# Patient Record
Sex: MALE | Race: WHITE
[De-identification: names, ages, dates, MRNs, and addresses within clinical notes are randomized per-mention and may not be internally consistent; named-entity substitution may affect disease eponyms.]

---

## 2018-08-29 ENCOUNTER — HOSPITAL ENCOUNTER (OUTPATIENT)
Dept: HOSPITAL 53 - M OPP | Age: 63
Discharge: HOME | End: 2018-08-29
Attending: SURGERY
Payer: MEDICARE

## 2018-08-29 DIAGNOSIS — Z80.3: ICD-10-CM

## 2018-08-29 DIAGNOSIS — D12.2: ICD-10-CM

## 2018-08-29 DIAGNOSIS — F63.9: ICD-10-CM

## 2018-08-29 DIAGNOSIS — F32.9: ICD-10-CM

## 2018-08-29 DIAGNOSIS — R56.9: ICD-10-CM

## 2018-08-29 DIAGNOSIS — Z79.899: ICD-10-CM

## 2018-08-29 DIAGNOSIS — K21.9: ICD-10-CM

## 2018-08-29 DIAGNOSIS — K63.89: ICD-10-CM

## 2018-08-29 DIAGNOSIS — F41.9: ICD-10-CM

## 2018-08-29 DIAGNOSIS — R39.89: ICD-10-CM

## 2018-08-29 DIAGNOSIS — D12.3: ICD-10-CM

## 2018-08-29 DIAGNOSIS — Z86.010: ICD-10-CM

## 2018-08-29 DIAGNOSIS — R12: ICD-10-CM

## 2018-08-29 DIAGNOSIS — D49.0: ICD-10-CM

## 2018-08-29 DIAGNOSIS — R26.89: ICD-10-CM

## 2018-08-29 DIAGNOSIS — G82.50: ICD-10-CM

## 2018-08-29 DIAGNOSIS — F70: ICD-10-CM

## 2018-08-29 DIAGNOSIS — M81.0: ICD-10-CM

## 2018-08-29 DIAGNOSIS — Z09: Primary | ICD-10-CM

## 2018-08-29 DIAGNOSIS — K64.8: ICD-10-CM

## 2018-08-29 DIAGNOSIS — K59.00: ICD-10-CM

## 2018-08-29 DIAGNOSIS — Z88.8: ICD-10-CM

## 2018-08-29 DIAGNOSIS — M19.90: ICD-10-CM

## 2018-08-29 DIAGNOSIS — Z80.6: ICD-10-CM

## 2018-08-29 PROCEDURE — 45385 COLONOSCOPY W/LESION REMOVAL: CPT

## 2018-10-29 ENCOUNTER — HOSPITAL ENCOUNTER (EMERGENCY)
Dept: HOSPITAL 53 - M ED | Age: 63
Discharge: HOME | End: 2018-10-29
Payer: MEDICARE
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
## 2018-10-29 DIAGNOSIS — T14.8XXA: ICD-10-CM

## 2018-10-29 DIAGNOSIS — Z88.8: ICD-10-CM
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
## 2018-10-29 DIAGNOSIS — M47.812: ICD-10-CM
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
## 2018-10-29 DIAGNOSIS — S00.03XA: Primary | ICD-10-CM

## 2018-10-29 DIAGNOSIS — M25.78: ICD-10-CM
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
## 2018-10-29 DIAGNOSIS — Z79.899: ICD-10-CM

## 2018-10-29 PROCEDURE — 72110 X-RAY EXAM L-2 SPINE 4/>VWS: CPT

## 2018-10-29 RX ADMIN — HYDROCODONE BITARTRATE AND ACETAMINOPHEN 1 TAB: 5; 325 TABLET ORAL at 13:43

## 2018-11-14 ENCOUNTER — HOSPITAL ENCOUNTER (OUTPATIENT)
Dept: HOSPITAL 53 - M LAB | Age: 63
End: 2018-11-14
Attending: PHYSICIAN ASSISTANT
Payer: MEDICARE

## 2018-11-14 DIAGNOSIS — F63.81: Primary | ICD-10-CM

## 2018-11-14 DIAGNOSIS — Z79.899: ICD-10-CM

## 2018-11-14 LAB
BASO #: 0 10^3/UL (ref 0–0.2)
BASO %: 0 % (ref 0–1)
CHOLEST SERPL-MCNC: 176 MG/DL (ref ?–200)
CHOLESTEROL RISK RATIO: 3.74 (ref ?–5)
EOS #: 0.2 10^3/UL (ref 0–0.5)
EOSINOPHIL NFR BLD AUTO: 1.9 % (ref 0–3)
EST. AVERAGE GLUCOSE BLD GHB EST-MCNC: 111 MG/DL (ref 60–110)
HDLC SERPL-MCNC: 47 MG/DL (ref 40–?)
HEMATOCRIT: 39.5 % (ref 42–52)
HEMOGLOBIN: 12.8 G/DL (ref 13.5–17.5)
IMMATURE GRANULOCYTE #: 0 10^3/UL (ref 0–0)
IMMATURE GRANULOCYTE %: 0.5 % (ref 0–3)
LDL CHOLESTEROL: 81 MG/DL (ref ?–100)
LYMPH #: 1.6 10^3/UL (ref 1.5–4.5)
LYMPH %: 20.3 % (ref 24–44)
MEAN CORPUSCULAR HEMOGLOBIN: 31.6 PG (ref 27–33)
MEAN CORPUSCULAR HGB CONC: 32.4 G/DL (ref 32–36.5)
MEAN CORPUSCULAR VOLUME: 97.5 FL (ref 80–96)
MONO #: 0.6 10^3/UL (ref 0–0.8)
MONO %: 7 % (ref 0–5)
NEUTROPHILS #: 5.7 10^3/UL (ref 1.8–7.7)
NEUTROPHILS %: 70.3 % (ref 36–66)
NONHDLC SERPL-MCNC: 129 MG/DL
NRBC BLD AUTO-RTO: 0 % (ref 0–0)
PLATELET COUNT, AUTOMATED: 296 10^3/UL (ref 150–450)
PROLACTIN: 8.6 NG/ML (ref 2.1–17.7)
RED BLOOD COUNT: 4.05 10^6/UL (ref 4.3–6.1)
RED CELL DISTRIBUTION WIDTH: 12.9 % (ref 11.5–14.5)
TRIGLYCERIDES LEVEL: 240 MG/DL (ref ?–150)
WHITE BLOOD COUNT: 8 10^3/UL (ref 4–10)

## 2018-11-14 PROCEDURE — 84146 ASSAY OF PROLACTIN: CPT

## 2019-03-01 ENCOUNTER — HOSPITAL ENCOUNTER (OUTPATIENT)
Dept: HOSPITAL 53 - M LAB REF | Age: 64
End: 2019-03-01
Attending: FAMILY MEDICINE
Payer: MEDICARE

## 2019-03-01 DIAGNOSIS — D64.9: Primary | ICD-10-CM

## 2019-03-01 LAB
FERRITIN SERPL-MCNC: 89 NG/ML (ref 26–388)
FOLATE SERPL-MCNC: 20.8 NG/ML
VIT B12 SERPL-MCNC: 858 PG/ML

## 2019-07-15 ENCOUNTER — HOSPITAL ENCOUNTER (OUTPATIENT)
Dept: HOSPITAL 53 - M LABNEURO | Age: 64
End: 2019-07-15
Attending: PSYCHIATRY & NEUROLOGY
Payer: MEDICARE

## 2019-07-15 DIAGNOSIS — R56.9: Primary | ICD-10-CM

## 2019-07-15 LAB
ALBUMIN SERPL BCG-MCNC: 3.8 GM/DL (ref 3.2–5.2)
ALT SERPL W P-5'-P-CCNC: 24 U/L (ref 12–78)
BASOPHILS # BLD AUTO: 0 10^3/UL (ref 0–0.2)
BASOPHILS NFR BLD AUTO: 0.4 % (ref 0–1)
BILIRUB SERPL-MCNC: 0.4 MG/DL (ref 0.2–1)
BUN SERPL-MCNC: 14 MG/DL (ref 7–18)
CALCIUM SERPL-MCNC: 9.7 MG/DL (ref 8.8–10.2)
CHLORIDE SERPL-SCNC: 104 MEQ/L (ref 98–107)
CO2 SERPL-SCNC: 29 MEQ/L (ref 21–32)
CREAT SERPL-MCNC: 0.79 MG/DL (ref 0.7–1.3)
EOSINOPHIL # BLD AUTO: 0.1 10^3/UL (ref 0–0.5)
EOSINOPHIL NFR BLD AUTO: 0.6 % (ref 0–3)
GFR SERPL CREATININE-BSD FRML MDRD: > 60 ML/MIN/{1.73_M2} (ref 49–?)
GLUCOSE SERPL-MCNC: 86 MG/DL (ref 70–100)
HCT VFR BLD AUTO: 38.9 % (ref 42–52)
HGB BLD-MCNC: 12.6 G/DL (ref 13.5–17.5)
LYMPHOCYTES # BLD AUTO: 1.8 10^3/UL (ref 1.5–4.5)
LYMPHOCYTES NFR BLD AUTO: 19.7 % (ref 24–44)
MCH RBC QN AUTO: 30.8 PG (ref 27–33)
MCHC RBC AUTO-ENTMCNC: 32.4 G/DL (ref 32–36.5)
MCV RBC AUTO: 95.1 FL (ref 80–96)
MONOCYTES # BLD AUTO: 0.8 10^3/UL (ref 0–0.8)
MONOCYTES NFR BLD AUTO: 8.7 % (ref 0–5)
NEUTROPHILS # BLD AUTO: 6.5 10^3/UL (ref 1.8–7.7)
NEUTROPHILS NFR BLD AUTO: 70.3 % (ref 36–66)
PLATELET # BLD AUTO: 305 10^3/UL (ref 150–450)
POTASSIUM SERPL-SCNC: 4.3 MEQ/L (ref 3.5–5.1)
PROT SERPL-MCNC: 7.7 GM/DL (ref 6.4–8.2)
RBC # BLD AUTO: 4.09 10^6/UL (ref 4.3–6.1)
SODIUM SERPL-SCNC: 140 MEQ/L (ref 136–145)
WBC # BLD AUTO: 9.3 10^3/UL (ref 4–10)

## 2019-10-15 ENCOUNTER — HOSPITAL ENCOUNTER (EMERGENCY)
Dept: HOSPITAL 53 - M ED | Age: 64
Discharge: HOME | End: 2019-10-15
Payer: MEDICARE

## 2019-10-15 VITALS — SYSTOLIC BLOOD PRESSURE: 167 MMHG | DIASTOLIC BLOOD PRESSURE: 81 MMHG
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
## 2019-10-15 DIAGNOSIS — S40.012A: ICD-10-CM
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
## 2019-10-15 DIAGNOSIS — R56.9: ICD-10-CM

## 2019-10-15 DIAGNOSIS — Z79.899: ICD-10-CM

## 2019-10-15 DIAGNOSIS — Z88.8: ICD-10-CM

## 2019-10-15 DIAGNOSIS — S70.01XA: Primary | ICD-10-CM

## 2019-10-15 DIAGNOSIS — N40.0: ICD-10-CM

## 2019-10-15 NOTE — REP
CT brain:  10/15/2019.

 

Indication:  Head trauma.

 

Comparison:  10/29/2019.

 

Technique:  Unenhanced axial images of the brain were obtained from skull base to

vertex.

 

Findings:

 

There is no acute intracranial hemorrhage, acute cortical infarction, mass

effect, hydrocephalus or acute calvarial fracture.  Mild diffuse volume loss is

present.  There are a few patchy areas of hypoattenuation scattered throughout

the subcortical and periventricular white matter most consistent with early

microangiopathic ischemic disease.

 

Impression:

 

No acute intracranial process.

 

 

Electronically Signed by

Adelfo Sharp DO 10/15/2019 10:55 A

## 2019-10-15 NOTE — REP
CT cervical spine:  10/15/2019.

 

Indication:  Cervical spine trauma.

 

Comparison:  10/29/2019.

 

Findings:

 

There is no acute fracture, subluxation or dislocation.  There is exaggeration of

the cervical lordosis.  Multilevel degenerative changes are present with the

greatest narrowing of the spinal canal at C3/C4 and C4/C5 with flattening of the

ventral cord.  There is a subcentimeter lucency within the superior aspect of C5

along the superior endplate most likely representing a cyst.  Stable.  There is

no hemorrhage within the spinal canal.

 

Impression:

 

No acute post traumatic injuries of the cervical spine.

 

 

Electronically Signed by

Adelfo Sharp DO 10/15/2019 11:02 A

## 2019-10-15 NOTE — REP
Left shoulder:  Three views.

 

History:  Injury in a fall.

 

Comparison left shoulder radiographs are from May 13, 2018.

 

Findings:  The left glenohumeral and acromioclavicular joints are normally

aligned.  No fracture is seen.  There is mild AC joint spurring.  This is

unchanged.  Periarticular soft tissues are unremarkable.  There is no visible

fracture or subluxation.

 

Impression:

 

Mild AC joint spurring.  No fracture or subluxation seen.

 

 

Electronically Signed by

Junaid Maciel MD 10/15/2019 06:44 P

## 2019-10-15 NOTE — REP
Pelvis right hip:  Three views.

 

History:  Injury in a fall.

 

Findings:  AP view of the pelvis shows an intact bony pelvic ring.  The patient

is rotated somewhat to the left.  There is osteoarthritis of the hips bilaterally

but no hip fracture is appreciated.  There is also tendon insertion site spurring

on the greater and lesser trochanters.  Degenerative disc disease is seen at L4-5

in the lumbar spine.  The bowel gas pattern is unremarkable.  AP and frog-leg

views of the right hip show osteoarthritis and trochanteric tendon insertion site

spurring.

 

Impression:

 

No acute bony abnormality.

 

 

Electronically Signed by

Junaid Maciel MD 10/15/2019 06:44 P

## 2020-01-17 ENCOUNTER — HOSPITAL ENCOUNTER (OUTPATIENT)
Dept: HOSPITAL 53 - M ST | Age: 65
End: 2020-01-17
Attending: INTERNAL MEDICINE
Payer: MEDICARE

## 2020-01-17 DIAGNOSIS — R13.10: Primary | ICD-10-CM

## 2020-01-17 NOTE — REP
COOKIE SWALLOW

 

The procedure was performed under the direct supervision of Dr. Maciel.

 

The procedure was performed with Sully Brasher from speech pathology present.

 

5 ml aliquots of thin, pudding, mixed fruit, nectar, honey and puree consistency

barium was administered.  There is laryngeal penetration with all consistency

barium except mixed fruit.

 

The detailed report of this examination will be provided by speech pathology.

 

1.4 minutes of fluoroscopy time was utilized for this procedure.

 

 

Electronically Signed by

CAITLYN Murillo 01/17/2020 03:33 P

Electronically Signed by

Junaid Maciel MD 01/17/2020 08:43 P

## 2020-01-28 ENCOUNTER — HOSPITAL ENCOUNTER (OUTPATIENT)
Dept: HOSPITAL 53 - M ADAMS | Age: 65
End: 2020-01-28
Attending: NURSE PRACTITIONER
Payer: MEDICARE

## 2020-01-28 DIAGNOSIS — M79.641: Primary | ICD-10-CM

## 2020-01-29 NOTE — REP
RIGHT HAND THREE VIEWS:

 

Three views of the right hand performed.

 

The study is extremely limited as the fingers are in a flexed position and the

patient is unable to straightened the 2nd through 5th digits.  No definite

fracture or dislocation is seen.

 

 

Electronically Signed by

Hector Hernandez MD 01/29/2020 10:45 P

## 2021-01-20 ENCOUNTER — HOSPITAL ENCOUNTER (OUTPATIENT)
Dept: HOSPITAL 53 - M LABSMTC | Age: 66
End: 2021-01-20
Attending: ANESTHESIOLOGY
Payer: MEDICARE

## 2021-01-20 DIAGNOSIS — Z20.828: ICD-10-CM

## 2021-01-20 DIAGNOSIS — Z01.812: Primary | ICD-10-CM

## 2021-03-10 ENCOUNTER — HOSPITAL ENCOUNTER (OUTPATIENT)
Dept: HOSPITAL 53 - M LABSMTC | Age: 66
End: 2021-03-10
Attending: ANESTHESIOLOGY
Payer: MEDICARE

## 2021-03-10 DIAGNOSIS — Z01.812: Primary | ICD-10-CM

## 2021-03-10 DIAGNOSIS — Z20.822: ICD-10-CM

## 2021-03-15 ENCOUNTER — HOSPITAL ENCOUNTER (OUTPATIENT)
Dept: HOSPITAL 53 - M OPP | Age: 66
Discharge: HOME | End: 2021-03-15
Attending: INTERNAL MEDICINE
Payer: MEDICARE

## 2021-03-15 VITALS — DIASTOLIC BLOOD PRESSURE: 114 MMHG | SYSTOLIC BLOOD PRESSURE: 220 MMHG

## 2021-03-15 VITALS — BODY MASS INDEX: 25.88 KG/M2 | WEIGHT: 161 LBS | HEIGHT: 66 IN

## 2021-03-15 DIAGNOSIS — Z79.899: ICD-10-CM

## 2021-03-15 DIAGNOSIS — Z88.8: ICD-10-CM

## 2021-03-15 DIAGNOSIS — Z12.11: Primary | ICD-10-CM

## 2021-03-15 DIAGNOSIS — Z86.010: ICD-10-CM

## 2021-03-15 NOTE — ROOR
________________________________________________________________________________

Patient Name: Juanjose Nielsen         Procedure Date: 3/15/2021 8:39 AM

MRN: [id number]                          Account Number: [id number]

YOB: 1955              Age: 65

Room: [facility]                            Gender: Male

Note Status: Finalized                 

________________________________________________________________________________

 

Procedure:            Colonoscopy to 50 cms-POOR PREP

Indications:          High risk colon cancer surveillance: Personal history of 

                      colonic polyps, Last colonoscopy: 2015

Providers:            Moreno Hernández MD

Referring MD:         James Diamond MD

Requesting Provider:  

Medicines:            Monitored Anesthesia Care

Complications:        No immediate complications.

________________________________________________________________________________

Procedure:            Pre-Anesthesia Assessment:

                      - The heart rate, respiratory rate, oxygen saturations, 

                      blood pressure, adequacy of pulmonary ventilation, and 

                      response to care were monitored throughout the procedure.

                      The Colonoscope was introduced through the anus with the 

                      intention of advancing to the cecum. The scope was 

                      advanced to the sigmoid colon before the procedure was 

                      aborted. Medications were given. The colonoscopy was 

                      performed without difficulty. The patient tolerated the 

                      procedure well. The quality of the bowel preparation was 

                      inadequate.

                                                                                

Findings:

     The perianal and digital rectal examinations were normal.

     A large amount of stool was found in the rectum, in the recto-sigmoid 

     colon and in the descending colon, making visualization difficult.

     The entire examined colon appeared normal.

                                                                                

Impression:           - Preparation of the colon was inadequate.

                      - Stool in the rectum, in the recto-sigmoid colon and in 

                      the descending colon.

                      - The entire examined colon is normal.

                      - No specimens collected.

Recommendation:       - Patient has a contact number available for 

                      emergencies. The signs and symptoms of potential delayed 

                      complications were discussed with the patient. Return to 

                      normal activities tomorrow. Written discharge 

                      instructions were provided to the patient.

                      - Discharge patient to home.

                      - Resume previous diet.

                      - Repeat colonoscopy at appointment to be scheduled 

                      because the bowel preparation was poor.

                      - Return to referring physician.

                      - The findings and recommendations were discussed with 

                      the patient.

                                                                                

Procedure Code(s):    --- Professional ---

                      , 53, Colorectal cancer screening; colonoscopy on 

                      individual at high risk

Diagnosis Code(s):    --- Professional ---

                      Z86.010, Personal history of colonic polyps

 

CPT copyright 2019 [employer]. All rights reserved.

 

The codes documented in this report are preliminary and upon  review may 

be revised to meet current compliance requirements.

 

Moreno Hernández MD

____________________

Moreno Hernández MD

3/15/2021 8:59:44 AM

Electronically signed by Moreno Hernández MD

Number of Addenda: 0

 

Note Initiated On: 3/15/2021 8:39 AM

Estimated Blood Loss: Estimated blood loss: none.

## 2021-05-19 ENCOUNTER — HOSPITAL ENCOUNTER (OUTPATIENT)
Dept: HOSPITAL 53 - M LABSMTC | Age: 66
End: 2021-05-19
Attending: ANESTHESIOLOGY
Payer: MEDICARE

## 2021-05-19 DIAGNOSIS — Z01.818: Primary | ICD-10-CM

## 2021-05-19 DIAGNOSIS — Z11.59: ICD-10-CM

## 2021-05-24 ENCOUNTER — HOSPITAL ENCOUNTER (OUTPATIENT)
Dept: HOSPITAL 53 - M OPP | Age: 66
Discharge: HOME | End: 2021-05-24
Attending: INTERNAL MEDICINE
Payer: MEDICARE

## 2021-05-24 VITALS — DIASTOLIC BLOOD PRESSURE: 77 MMHG | SYSTOLIC BLOOD PRESSURE: 149 MMHG

## 2021-05-24 VITALS — WEIGHT: 162 LBS | HEIGHT: 66 IN | BODY MASS INDEX: 26.03 KG/M2

## 2021-05-24 DIAGNOSIS — Z79.899: ICD-10-CM

## 2021-05-24 DIAGNOSIS — Z12.11: Primary | ICD-10-CM

## 2021-05-24 DIAGNOSIS — Z88.8: ICD-10-CM

## 2021-05-24 DIAGNOSIS — M81.0: ICD-10-CM

## 2021-05-24 DIAGNOSIS — M19.90: ICD-10-CM

## 2021-05-24 DIAGNOSIS — Z86.010: ICD-10-CM

## 2021-05-24 NOTE — ROOR
________________________________________________________________________________

Patient Name: Juanjose Nielsen         Procedure Date: 5/24/2021 8:56 AM

MRN: [id number]                          Account Number: [id number]

YOB: 1955              Age: 65

Room: [facility]                            Gender: Male

Note Status: Finalized                 

________________________________________________________________________________

 

Procedure:            Total Colonoscopy to Cecum

Indications:          High risk colon cancer surveillance: Personal history of 

                      colonic polyps, Last colonoscopy: 2015

Providers:            Moreno Hernández MD

Referring MD:         James Diamond MD

Requesting Provider:  

Medicines:            Monitored Anesthesia Care

Complications:        No immediate complications.

________________________________________________________________________________

Procedure:            Pre-Anesthesia Assessment:

                      - The heart rate, respiratory rate, oxygen saturations, 

                      blood pressure, adequacy of pulmonary ventilation, and 

                      response to care were monitored throughout the procedure.

                      The Colonoscope was introduced through the anus and 

                      advanced to the cecum, identified by appendiceal orifice 

                      and ileocecal valve. The colonoscopy was performed 

                      without difficulty. The patient tolerated the procedure 

                      well. The quality of the bowel preparation was poor.

                                                                                

Findings:

     The perianal and digital rectal examinations were normal.

     Extensive amounts of stool was found in the entire colon, interfering 

     with visualization. Lavage of the area was performed, resulting in 

     incomplete clearance with continued poor visualization.

     The exam was otherwise without abnormality.

                                                                                

Impression:           - Preparation of the colon was poor.

                      - Stool in the entire examined colon.

                      - The examination was otherwise normal.

                      - No specimens collected.

                      - The examination was otherwise normal.

Recommendation:       - Patient has a contact number available for 

                      emergencies. The signs and symptoms of potential delayed 

                      complications were discussed with the patient. Return to 

                      normal activities tomorrow. Written discharge 

                      instructions were provided to the patient.

                      - Discharge patient to home.

                      - Continue present medications.

                      - Repeat colonoscopy in 3 years for surveillance.

                      - Return to referring physician.

                      - The findings and recommendations were discussed with 

                      the patient's family.

                                                                                

Procedure Code(s):    --- Professional ---

                      , Colorectal cancer screening; colonoscopy on 

                      individual at high risk

Diagnosis Code(s):    --- Professional ---

                      Z86.010, Personal history of colonic polyps

 

CPT copyright 2019 [employer]. All rights reserved.

 

The codes documented in this report are preliminary and upon  review may 

be revised to meet current compliance requirements.

 

Moreno Hernández MD

____________________

Moreno Hernández MD

5/24/2021 9:23:31 AM

Electronically signed by Moreno Hernández MD

Number of Addenda: 0

 

Note Initiated On: 5/24/2021 8:56 AM

Estimated Blood Loss: Estimated blood loss: none.

## 2021-06-04 ENCOUNTER — HOSPITAL ENCOUNTER (OUTPATIENT)
Dept: HOSPITAL 53 - M ST | Age: 66
End: 2021-06-04
Attending: FAMILY MEDICINE
Payer: MEDICARE

## 2021-06-04 DIAGNOSIS — R13.10: Primary | ICD-10-CM

## 2021-06-04 NOTE — REP
INDICATION:

R13.10 dysphagia.



COMPARISON:

None.



TECHNIQUE:

The procedure was performed by Abi Condon [facility], under the direct

supervision of Dr. Hernandez.



The procedure was performed with Zaida Finley from speech pathology present.



5 ml aliquots of thin, pudding, soft food, nectar thick and honey thick consistency

barium was administered.



FINDINGS:

Both penetration and aspiration were visualized during the exam.



The detailed report of this examination will be provided by speech pathology.



IMPRESSION:

Penetration and aspiration were visualized during the exam, a detailed report will be

provided by speech pathology.



3.8 minutes of fluoroscopy time was utilized for this procedure. Some fluoroscopic

images are performed with last image hold technology.  These images require no

additional radiation





<Electronically signed by Abi Condon > 06/04/21 1446

<Electronically signed by Hector Hernandez > 06/04/21 1455

## 2021-06-21 ENCOUNTER — HOSPITAL ENCOUNTER (OUTPATIENT)
Dept: HOSPITAL 53 - M LABDRWAD | Age: 66
End: 2021-06-21
Attending: PHYSICIAN ASSISTANT
Payer: MEDICARE

## 2021-06-21 DIAGNOSIS — Z79.899: Primary | ICD-10-CM

## 2021-06-21 LAB
ALBUMIN SERPL BCG-MCNC: 3.7 GM/DL (ref 3.2–5.2)
ALT SERPL W P-5'-P-CCNC: 22 U/L (ref 12–78)
BASOPHILS # BLD AUTO: 0 10^3/UL (ref 0–0.2)
BASOPHILS NFR BLD AUTO: 0.6 % (ref 0–1)
BILIRUB SERPL-MCNC: 0.3 MG/DL (ref 0.2–1)
BUN SERPL-MCNC: 14 MG/DL (ref 7–18)
CALCIUM SERPL-MCNC: 9.3 MG/DL (ref 8.8–10.2)
CHLORIDE SERPL-SCNC: 108 MEQ/L (ref 98–107)
CHOLEST SERPL-MCNC: 155 MG/DL (ref ?–200)
CHOLEST/HDLC SERPL: 3.37 {RATIO} (ref ?–5)
CO2 SERPL-SCNC: 30 MEQ/L (ref 21–32)
CREAT SERPL-MCNC: 0.78 MG/DL (ref 0.7–1.3)
EOSINOPHIL # BLD AUTO: 0.1 10^3/UL (ref 0–0.5)
EOSINOPHIL NFR BLD AUTO: 1.6 % (ref 0–3)
EST. AVERAGE GLUCOSE BLD GHB EST-MCNC: 114 MG/DL (ref 60–110)
GFR SERPL CREATININE-BSD FRML MDRD: > 60 ML/MIN/{1.73_M2} (ref 49–?)
GLUCOSE SERPL-MCNC: 99 MG/DL (ref 70–100)
HCT VFR BLD AUTO: 40.6 % (ref 42–52)
HDLC SERPL-MCNC: 46 MG/DL (ref 40–?)
HGB BLD-MCNC: 13 G/DL (ref 13.5–17.5)
LDLC SERPL CALC-MCNC: 78 MG/DL (ref ?–100)
LYMPHOCYTES # BLD AUTO: 1.1 10^3/UL (ref 1.5–5)
LYMPHOCYTES NFR BLD AUTO: 22.8 % (ref 24–44)
MCH RBC QN AUTO: 31.1 PG (ref 27–33)
MCHC RBC AUTO-ENTMCNC: 32 G/DL (ref 32–36.5)
MCV RBC AUTO: 97.1 FL (ref 80–96)
MONOCYTES # BLD AUTO: 0.3 10^3/UL (ref 0–0.8)
MONOCYTES NFR BLD AUTO: 5.3 % (ref 2–8)
NEUTROPHILS # BLD AUTO: 3.4 10^3/UL (ref 1.5–8.5)
NEUTROPHILS NFR BLD AUTO: 69.3 % (ref 36–66)
NONHDLC SERPL-MCNC: 109 MG/DL
PLATELET # BLD AUTO: 257 10^3/UL (ref 150–450)
POTASSIUM SERPL-SCNC: 4.7 MEQ/L (ref 3.5–5.1)
PROLACTIN SERPL-MCNC: 7.9 NG/ML (ref 2.1–17.7)
PROT SERPL-MCNC: 7.2 GM/DL (ref 6.4–8.2)
RBC # BLD AUTO: 4.18 10^6/UL (ref 4.3–6.1)
SODIUM SERPL-SCNC: 140 MEQ/L (ref 136–145)
TRIGL SERPL-MCNC: 156 MG/DL (ref ?–150)
WBC # BLD AUTO: 4.9 10^3/UL (ref 4–10)

## 2022-05-19 ENCOUNTER — HOSPITAL ENCOUNTER (OUTPATIENT)
Dept: HOSPITAL 53 - M ADAMS | Age: 67
End: 2022-05-19
Attending: PHYSICIAN ASSISTANT
Payer: MEDICARE

## 2022-05-19 DIAGNOSIS — Z79.899: Primary | ICD-10-CM

## 2022-05-19 LAB
ALBUMIN SERPL BCG-MCNC: 3.8 GM/DL (ref 3.2–5.2)
ALT SERPL W P-5'-P-CCNC: 25 U/L (ref 12–78)
BASOPHILS # BLD AUTO: 0 10^3/UL (ref 0–0.2)
BASOPHILS NFR BLD AUTO: 0.5 % (ref 0–1)
BILIRUB SERPL-MCNC: 0.4 MG/DL (ref 0.2–1)
BUN SERPL-MCNC: 13 MG/DL (ref 7–18)
CALCIUM SERPL-MCNC: 9.5 MG/DL (ref 8.8–10.2)
CHLORIDE SERPL-SCNC: 105 MEQ/L (ref 98–107)
CHOLEST SERPL-MCNC: 144 MG/DL (ref ?–200)
CHOLEST/HDLC SERPL: 2.72 {RATIO} (ref ?–5)
CO2 SERPL-SCNC: 33 MEQ/L (ref 21–32)
CREAT SERPL-MCNC: 0.81 MG/DL (ref 0.7–1.3)
EOSINOPHIL # BLD AUTO: 0.1 10^3/UL (ref 0–0.5)
EOSINOPHIL NFR BLD AUTO: 1.1 % (ref 0–3)
EST. AVERAGE GLUCOSE BLD GHB EST-MCNC: 103 MG/DL (ref 60–110)
GFR SERPL CREATININE-BSD FRML MDRD: > 60 ML/MIN/{1.73_M2} (ref 49–?)
GLUCOSE SERPL-MCNC: 99 MG/DL (ref 70–100)
HCT VFR BLD AUTO: 39.3 % (ref 42–52)
HDLC SERPL-MCNC: 53 MG/DL (ref 40–?)
HGB BLD-MCNC: 12.8 G/DL (ref 13.5–17.5)
LDLC SERPL CALC-MCNC: 62 MG/DL (ref ?–100)
LYMPHOCYTES # BLD AUTO: 1.3 10^3/UL (ref 1.5–5)
LYMPHOCYTES NFR BLD AUTO: 19.2 % (ref 24–44)
MCH RBC QN AUTO: 31.4 PG (ref 27–33)
MCHC RBC AUTO-ENTMCNC: 32.6 G/DL (ref 32–36.5)
MCV RBC AUTO: 96.6 FL (ref 80–96)
MONOCYTES # BLD AUTO: 0.3 10^3/UL (ref 0–0.8)
MONOCYTES NFR BLD AUTO: 4.7 % (ref 2–8)
NEUTROPHILS # BLD AUTO: 4.9 10^3/UL (ref 1.5–8.5)
NEUTROPHILS NFR BLD AUTO: 73.9 % (ref 36–66)
NONHDLC SERPL-MCNC: 91 MG/DL
PLATELET # BLD AUTO: 261 10^3/UL (ref 150–450)
POTASSIUM SERPL-SCNC: 4.4 MEQ/L (ref 3.5–5.1)
PROT SERPL-MCNC: 7.2 GM/DL (ref 6.4–8.2)
RBC # BLD AUTO: 4.07 10^6/UL (ref 4.3–6.1)
SODIUM SERPL-SCNC: 142 MEQ/L (ref 136–145)
TRIGL SERPL-MCNC: 147 MG/DL (ref ?–150)
WBC # BLD AUTO: 6.6 10^3/UL (ref 4–10)

## 2022-11-14 ENCOUNTER — HOSPITAL ENCOUNTER (OUTPATIENT)
Dept: HOSPITAL 53 - M LAB | Age: 67
End: 2022-11-14
Attending: GENERAL ACUTE CARE HOSPITAL